# Patient Record
(demographics unavailable — no encounter records)

---

## 2025-01-23 NOTE — HISTORY OF PRESENT ILLNESS
[FreeTextEntry1] : In 2017 the patient underwent a screening colonoscopy with removal of 5 tubular adenomas from the ascending, transverse and sigmoid colon. In January 2023 a follow-up colonoscopy revealed 9 polyps ranging in size from 3 to 8 mm which were scattered throughout the colon and all were removed with polypectomy. All the polyps were either tubular adenomas or tubulovillous adenomas or sessile serrated adenomas. There was no high-grade dysplasia. There were also moderate numbers of left-sided diverticuli as well as some diverticuli in the hepatic flexure. I recommended a follow-up exam in 3 years. In 2019 he underwent an upper endoscopy for evaluation of recurrent heartburn that revealed a 4 cm hiatal hernia as well as LA grade B esophagitis.  In November 2022 he was only taking the omeprazole intermittently and was experience somewhat frequent heartburn.  At that time he was told to take the omeprazole on a daily basis.  When last seen in September 2024 he was again experiencing frequent heartburn as well as abdominal gas and bloating.  He continues to take omeprazole only intermittently.  At that time he was eating large amounts of chocolate.  He was told to take omeprazole 40 mg p.o. every morning and famotidine 40 mg every evening.  He was also told to stop consuming chocolate and adhere to a diet which was low in FODMAPs.  The patient returns today for routine follow-up.  He has been taking the omeprazole and famotidine daily and is feeling much better averaging only 1 episode of breakthrough heartburn weekly on average which occurs only with dietary indiscretion such as spicy foods or black coffee.  There is no dysphagia.  He is taking Citrucel and is experiencing a daily bowel movement.  He denies any nausea or vomiting.  He has a prior history of hepatitis C for which she was treated a [facility] in the late 1990s with eradication.  He notes that his bloating is also significantly improved. [Negative] : Allergic/Immunologic [Immunizations are up to date by report] : Immunizations are up to date by report

## 2025-01-23 NOTE — ASSESSMENT
[FreeTextEntry1] : This time I recommended that he continue with the same medical regimen.  He will obtain a CBC, basic metabolic profile, liver function test as well as hepatitis C RNA level.  He will be seen again in the fall of this year in anticipation of a follow-up colonoscopy in January 2026.

## 2025-01-23 NOTE — REASON FOR VISIT
[Follow-up] : a follow-up of an existing diagnosis [FreeTextEntry1] : Moderate-sized hiatal hernia with LA grade B reflux esophagitis as well as chronic constipation and prior colon polyps

## 2025-01-23 NOTE — PHYSICAL EXAM
[Alert] : alert [Normal Voice/Communication] : normal voice/communication [Healthy Appearing] : healthy appearing [No Acute Distress] : no acute distress [Sclera] : the sclera and conjunctiva were normal [Hearing Threshold Finger Rub Not [place]] : hearing was normal [Normal Lips/Gums] : the lips and gums were normal [Oropharynx] : the oropharynx was normal [Normal Appearance] : the appearance of the neck was normal [No Respiratory Distress] : no respiratory distress [No Acc Muscle Use] : no accessory muscle use [Respiration, Rhythm And Depth] : normal respiratory rhythm and effort [Heart Rate And Rhythm] : heart rate was normal and rhythm regular [Bowel Sounds] : normal bowel sounds [Abdomen Tenderness] : non-tender [No Masses] : no abdominal mass palpated [Abdomen Soft] : soft [] : no hepatosplenomegaly [Oriented To Time, Place, And Person] : oriented to person, place, and time